# Patient Record
Sex: FEMALE | Race: WHITE | NOT HISPANIC OR LATINO | URBAN - METROPOLITAN AREA
[De-identification: names, ages, dates, MRNs, and addresses within clinical notes are randomized per-mention and may not be internally consistent; named-entity substitution may affect disease eponyms.]

---
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
## 2021-07-05 NOTE — PROCEDURE: REASSURANCE
I reviewed the H&P, I examined the patient, and there are no changes in the patient's condition.    
Detail Level: Zone